# Patient Record
Sex: FEMALE | Race: WHITE | NOT HISPANIC OR LATINO | Employment: OTHER | URBAN - METROPOLITAN AREA
[De-identification: names, ages, dates, MRNs, and addresses within clinical notes are randomized per-mention and may not be internally consistent; named-entity substitution may affect disease eponyms.]

---

## 2021-01-05 ENCOUNTER — CONSULT (OUTPATIENT)
Dept: CARDIOLOGY | Facility: CLINIC | Age: 81
End: 2021-01-05

## 2021-01-05 VITALS
DIASTOLIC BLOOD PRESSURE: 74 MMHG | SYSTOLIC BLOOD PRESSURE: 138 MMHG | OXYGEN SATURATION: 99 % | HEIGHT: 69 IN | WEIGHT: 198 LBS | BODY MASS INDEX: 29.33 KG/M2 | HEART RATE: 64 BPM

## 2021-01-05 DIAGNOSIS — I49.5 SINUS NODE DYSFUNCTION (HCC): ICD-10-CM

## 2021-01-05 DIAGNOSIS — I47.29 VENTRICULAR TACHYCARDIA (PAROXYSMAL) (HCC): Primary | ICD-10-CM

## 2021-01-05 PROCEDURE — 99205 OFFICE O/P NEW HI 60 MIN: CPT | Performed by: INTERNAL MEDICINE

## 2021-01-05 PROCEDURE — 93000 ELECTROCARDIOGRAM COMPLETE: CPT | Performed by: INTERNAL MEDICINE

## 2021-01-05 RX ORDER — MELOXICAM 15 MG/1
15 TABLET ORAL DAILY
COMMUNITY
Start: 2020-12-29

## 2021-01-05 RX ORDER — NEBIVOLOL HYDROCHLORIDE 10 MG/1
10 TABLET ORAL DAILY
COMMUNITY
Start: 2020-10-15

## 2021-01-05 RX ORDER — LEVOTHYROXINE SODIUM 50 UG/1
50 CAPSULE ORAL DAILY
COMMUNITY

## 2021-01-05 RX ORDER — SIMVASTATIN 20 MG
20 TABLET ORAL DAILY
COMMUNITY
Start: 2020-12-29

## 2021-01-05 RX ORDER — ASPIRIN 325 MG
325 TABLET ORAL DAILY
COMMUNITY

## 2021-01-05 RX ORDER — OLMESARTAN MEDOXOMIL AND HYDROCHLOROTHIAZIDE 40/25 40; 25 MG/1; MG/1
1 TABLET ORAL DAILY
COMMUNITY

## 2021-01-05 RX ORDER — ALLOPURINOL 300 MG/1
300 TABLET ORAL DAILY
COMMUNITY

## 2021-01-05 RX ORDER — DIPHENOXYLATE HYDROCHLORIDE AND ATROPINE SULFATE 2.5; .025 MG/1; MG/1
1 TABLET ORAL DAILY
COMMUNITY

## 2021-01-05 RX ORDER — TERAZOSIN 5 MG/1
5 CAPSULE ORAL DAILY
COMMUNITY
Start: 2020-12-09

## 2021-01-05 RX ORDER — FENOFIBRATE 48 MG/1
48 TABLET, COATED ORAL DAILY
COMMUNITY

## 2021-01-05 NOTE — PROGRESS NOTES
Christa Chavez  1940  PCP: Noreen Spann DO    SUBJECTIVE:   Christa Chavez is a 80 y.o. female seen for a consultation visit regarding the following:     Chief Complaint:   Chief Complaint   Patient presents with   • vtach          Consultation is requested by Noreen Spann DO for evaluation of vtach        History:  This is an 80-year-old patient referred by Dr. Spann for further evaluation management of sinus node dysfunction nonsustained ventricular tachycardia.  The patient in the summer 2020 started to develop periods of lightheadedness dizziness and fatigue.  A monitor was placed that showed runs of nonsustained ventricular tachycardia sinus node dysfunction.  She was sent for further evaluation with electrophysiology services in [place].  An echo and a cardiac cath was performed.  Both showed stable chronic findings.  A repeat monitor showed similar findings of symptomatic PVCs and runs of nonsustained ventricular tachycardia.      Cardiac PMH: (Old records have been reviewed and summarized below)  1.  Coronary artery disease-status post CABG 2011  2.  Palpitations  3.  Monitor-August 2020-min 48, mean 60, max 94-17 beat run of ventricular tachycardia  4.  Monitor-December 2020-min 49, mean 62, max 97-rare but symptomatic ventricular ectopy.    Past Medical History, Past Surgical History, Family history, Social History, and Medications were all reviewed with the patient today and updated as necessary.       Current Outpatient Medications:   •  allopurinol (ZYLOPRIM) 300 MG tablet, Take 300 mg by mouth Daily., Disp: , Rfl:   •  aspirin 325 MG tablet, Take 325 mg by mouth Daily., Disp: , Rfl:   •  Bystolic 10 MG tablet, Take 10 mg by mouth Daily., Disp: , Rfl:   •  fenofibrate (TRICOR) 48 MG tablet, Take 48 mg by mouth Daily., Disp: , Rfl:   •  levothyroxine sodium (TIROSINT) 50 MCG capsule, Take 50 mcg by mouth Daily., Disp: , Rfl:   •  meloxicam (MOBIC) 15 MG tablet, Take 15 mg by mouth Daily.,  "Disp: , Rfl:   •  multivitamin (MULTI-VITAMIN PO), Take 1 tablet by mouth Daily., Disp: , Rfl:   •  olmesartan-hydrochlorothiazide (BENICAR HCT) 40-25 MG per tablet, Take 1 tablet by mouth Daily., Disp: , Rfl:   •  simvastatin (ZOCOR) 20 MG tablet, Take 20 mg by mouth Daily., Disp: , Rfl:   •  terazosin (HYTRIN) 5 MG capsule, Take 5 mg by mouth Daily., Disp: , Rfl:     No Known Allergies      Past Medical History:   Diagnosis Date   • Anxiety    • Atrial fibrillation (CMS/HCC)    • Chicken pox    • Hyperlipidemia    • Hypertension    • Measles    • Mumps      Past Surgical History:   Procedure Laterality Date   • GALLBLADDER SURGERY     • HYSTERECTOMY     • OTHER SURGICAL HISTORY      open heart surgery    • SKIN CANCER EXCISION     • TOTAL KNEE ARTHROPLASTY       History reviewed. No pertinent family history.  Social History     Tobacco Use   • Smoking status: Never Smoker   • Smokeless tobacco: Never Used   Substance Use Topics   • Alcohol use: Never     Frequency: Never       ROS:  Review of Systems:  General: no recent weight loss/gain, weakness or fatigue  Skin: no rashes, lumps, or other skin changes  HEENT: no dizziness, lightheadedness, or vision changes  Respiratory: no cough or hemoptysis  Cardiovascular: + palpitations, and tachycardia  Gastrointestinal: no black/tarry stools or diarrhea  Urinary: no change in frequency or urgency  Peripheral Vascular: no claudication or leg cramps  Musculoskeletal: no muscle or joint pain/stiffness  Psychiatric: no depression or excessive stress  Neurological: no sensory or motor loss, no syncope  Hematologic: no anemia, easy bruising or bleeding  Endocrine: no thyroid problems, nor heat or cold intolerance       PHYSICAL EXAM:   /74 (BP Location: Left arm, Patient Position: Sitting)   Pulse 64   Ht 175.3 cm (69\")   Wt 89.8 kg (198 lb)   SpO2 99%   BMI 29.24 kg/m²      Wt Readings from Last 5 Encounters:   01/05/21 89.8 kg (198 lb)     BP Readings from Last 5 " Encounters:   01/05/21 138/74       General-Well Nourished, Well developed  Eyes - PERRLA  Neck- supple, No mass  CV- regular rate and rhythm, no MRG  Lung- clear bilaterally  Abd- soft, +BS  Musc/skel - Norm strength and range of motion  Skin- warm and dry  Neuro - Alert & Oriented x 3, appropriate mood.    Medical problems and test results were reviewed with the patient today.     No results found for this or any previous visit.      No results found for: CHOL, HDL, HDLC, LDL, LDLC, VLDL    EKG:  (EKG/Tracing has been independently visualized by me and summarized below)      ECG 12 Lead    Date/Time: 1/5/2021 12:38 PM  Performed by: Felix Rajput MD  Authorized by: Felix Rajput MD   Comparison: compared with previous ECG   Similar to previous ECG  Rhythm: sinus rhythm  Rate: normal  QRS axis: normal  Other findings: non-specific ST-T wave changes    Clinical impression: abnormal EKG            ASSESSMENT and PLAN  1.  Nonsustained ventricular tachycardia-patient has no significant findings on her cardiac cath and she has a normal ejection fraction by echo.  She does have concern about the 17 beat nonsustained ventricular tachycardia and the risk of sudden death.  We explain her therapy options.  Would recommend a diagnostic electrophysiology test to determine her risk of sudden death.  Based on the findings we will plan to start the patient on sotalol in place of her Bystolic therapy. We discussed potential Electrophysiology Study.  I described the procedures in detail including risks, alternatives, and benefits.  We also discussed that risks include bleeding, vascular damage, stroke, MI, esophageal damage, cardiac perforation, and even death.  2.  Sinus node dysfunction-patient does appear to have symptomatic sinus node dysfunction with poor chronotropic response.  We will reassess her sinus node function with the change in her beta-blockers.    Return for after procedure.    1. EP Study  2. Admit for  Sotalol  3. Hold Bystolic 1 day before        Felix Rajput M.D., F.OG.JD.C, F.H.R.S.  Cardiology/Electrophysiology  01/05/21  12:39 EST